# Patient Record
Sex: FEMALE | Race: WHITE
[De-identification: names, ages, dates, MRNs, and addresses within clinical notes are randomized per-mention and may not be internally consistent; named-entity substitution may affect disease eponyms.]

---

## 2021-11-29 ENCOUNTER — HOSPITAL ENCOUNTER (EMERGENCY)
Dept: HOSPITAL 56 - MW.ED | Age: 61
Discharge: HOME | End: 2021-11-29
Payer: COMMERCIAL

## 2021-11-29 DIAGNOSIS — U07.1: Primary | ICD-10-CM

## 2021-11-29 DIAGNOSIS — I50.9: ICD-10-CM

## 2021-11-29 DIAGNOSIS — I11.0: ICD-10-CM

## 2021-11-29 DIAGNOSIS — Z79.82: ICD-10-CM

## 2021-11-29 DIAGNOSIS — R00.0: ICD-10-CM

## 2021-11-29 DIAGNOSIS — Z88.1: ICD-10-CM

## 2021-11-29 DIAGNOSIS — Z79.899: ICD-10-CM

## 2021-11-29 LAB
BUN SERPL-MCNC: 15 MG/DL (ref 7–18)
CHLORIDE SERPL-SCNC: 104 MMOL/L (ref 98–107)
CO2 SERPL-SCNC: 27.3 MMOL/L (ref 21–32)
FLUAV RNA UPPER RESP QL NAA+PROBE: NEGATIVE
FLUBV RNA UPPER RESP QL NAA+PROBE: NEGATIVE
GLUCOSE SERPL-MCNC: 121 MG/DL (ref 74–106)
POTASSIUM SERPL-SCNC: 4.6 MMOL/L (ref 3.5–5.1)
SARS-COV-2 RNA RESP QL NAA+PROBE: POSITIVE
SODIUM SERPL-SCNC: 140 MMOL/L (ref 136–145)

## 2021-11-29 PROCEDURE — 93005 ELECTROCARDIOGRAM TRACING: CPT

## 2021-11-29 PROCEDURE — 96374 THER/PROPH/DIAG INJ IV PUSH: CPT

## 2021-11-29 PROCEDURE — 99284 EMERGENCY DEPT VISIT MOD MDM: CPT

## 2021-11-29 PROCEDURE — 71045 X-RAY EXAM CHEST 1 VIEW: CPT

## 2021-11-29 PROCEDURE — 85025 COMPLETE CBC W/AUTO DIFF WBC: CPT

## 2021-11-29 PROCEDURE — 80053 COMPREHEN METABOLIC PANEL: CPT

## 2021-11-29 PROCEDURE — 36415 COLL VENOUS BLD VENIPUNCTURE: CPT

## 2021-11-29 PROCEDURE — 0240U: CPT

## 2021-11-29 NOTE — CR
Indication:



Cough, possible corona virus 



Comparison:



None available.



Technique:



Single AP view chest



Findings:



There is hyperinflation and chronic interstitial change.



There is no focal consolidation, effusion, or pneumothorax.



The cardiomediastinal silhouette is within normal limits.



The bony thorax is grossly intact.



Impression:



Hyperinflation and chronic interstitial changes without definite dense or 

ground-glass consolidation at this time.



Dictated by Roscoe Martin MD @ 11/29/2021 8:13:48 AM



(Electronically Signed)

## 2021-11-29 NOTE — EDM.PDOC
ED HPI GENERAL MEDICAL PROBLEM





- General


Chief Complaint: Respiratory Problem


Stated Complaint: COVID SYMPTOMS


Time Seen by Provider: 11/29/21 06:41


Source of Information: Reports: Patient


History Limitations: Reports: No Limitations





- History of Present Illness


INITIAL COMMENTS - FREE TEXT/NARRATIVE: 





Patient is a 61-year-old female with a history of CHF high blood pressure who 

presented today for symptoms of fatigue weakness and cough.  Patient has 

concerns of having Covid.  She denies any shortness of breath states she does 

have a nonproductive cough she has diffuse body aches but denies any focal areas

of pain to chest pain abdominal pain.  States she has not been taking medication

because has been feeling nauseated as well and her taste sensation has been off 

as well as smell.


  ** Generalized


Pain Score (Numeric/FACES): 8





- Related Data


                                    Allergies











Allergy/AdvReac Type Severity Reaction Status Date / Time


 


cephalexin Allergy  Rash Verified 11/29/21 07:00











Home Meds: 


                                    Home Meds





Acetaminophen 1,000 mg PO BID 11/29/21 [History]


Albuterol Sulfate [Proair Digihaler] 90 mg IH DAILY 11/29/21 [History]


Ascorbic Acid [Vitamin C] 500 mg PO DAILY 11/29/21 [History]


Aspirin 81 mg PO DAILY 11/29/21 [History]


Calcium Carbonate 600 mg PO DAILY 11/29/21 [History]


Celecoxib 200 mg PO BID 11/29/21 [History]


Cetirizine [ZyrTEC] 10 mg PO DAILY 11/29/21 [History]


Cholecalciferol (Vitamin D3) [Vitamin D3] 125 mcg PO DAILY 11/29/21 [History]


DULoxetine [Cymbalta] 30 mg PO DAILY 11/29/21 [History]


Escitalopram [Lexapro] 20 mg PO DAILY 11/29/21 [History]


Eyelid Cleanser Combination 9 [Systane] 1 drop EYEBOTH DAILY 11/29/21 [History]


Famotidine 10 mg PO ASDIRECTED 11/29/21 [History]


Krill/Omega-3/Dha/Epa/Lipids [Omega-3 Krill Oil 500 mg Sfgl] 500 mg PO DAILY 

11/29/21 [History]


Lansoprazole 15 mg PO DAILY 11/29/21 [History]


Losartan [Cozaar] 50 mg PO DAILY 11/29/21 [History]


Melatonin 5 mg PO BEDTIME 11/29/21 [History]


Metoprolol Tartrate 25 mg PO BID 11/29/21 [History]


Mometasone/Formoterol [Dulera 200 Mcg-5 Mcg Inhaler] 200 mg IH DAILY 11/29/21 

[History]


Montelukast [Singulair] 10 mg PO DAILY 11/29/21 [History]


Pantoprazole [ProTONIX***] 40 mg PO DAILY 11/29/21 [History]


Simethicone 125 mg PO DAILY 11/29/21 [History]


Sodium Chloride/Aloe Vera [Ayr Saline Nasal Gel Spray] 1 spray NASBOTH DAILY 

11/29/21 [History]


Spironolactone [Aldactone] 25 mg PO DAILY 11/29/21 [History]


Triamcinolone Acetonide [Nasacort AQ Spray] 55 mcg NASBOTH DAILY 11/29/21 

[History]


Zinc 50 mg PO DAILY 11/29/21 [History]











ED ROS GENERAL





- Review of Systems


Review Of Systems: See Below


Constitutional: Reports: Chills, Malaise, Weakness, Fatigue


HEENT: Reports: No Symptoms


Respiratory: Reports: No Symptoms


Cardiovascular: Reports: No Symptoms


Endocrine: Reports: No Symptoms


GI/Abdominal: Reports: No Symptoms


: Reports: No Symptoms


Musculoskeletal: Reports: No Symptoms


Skin: Reports: No Symptoms


Neurological: Reports: No Symptoms


Psychiatric: Reports: No Symptoms


Hematologic/Lymphatic: Reports: No Symptoms


Immunologic: Reports: No Symptoms





ED EXAM, GENERAL





- Physical Exam


Exam: See Below


Exam Limited By: No Limitations


General Appearance: Alert, WD/WN, No Apparent Distress


Head: Atraumatic, Normocephalic


Respiratory/Chest: No Respiratory Distress, Lungs Clear, Normal Breath Sounds


Cardiovascular: Normal Peripheral Pulses, Regular Rate, Rhythm


GI/Abdominal: Normal Bowel Sounds, Soft, Non-Tender


Extremities: Normal Inspection, Normal Range of Motion


Neurological: Alert, Oriented, Normal Cognition, Normal Gait


  ** #1 Interpretation


EKG Date: 11/29/21


Time: 07:43


Rhythm: Other (sinus tach)


Rate (Beats/Min): 103


ST-T: Normal





Course





- Vital Signs


Last Recorded V/S: 


                                Last Vital Signs











Temp  97.5 F   11/29/21 07:23


 


Pulse  80   11/29/21 09:09


 


Resp  20   11/29/21 09:09


 


BP  144/75 H  11/29/21 09:09


 


Pulse Ox  95   11/29/21 09:09














- Orders/Labs/Meds


Orders: 


                               Active Orders 24 hr











 Category Date Time Status


 


 RT Aerosol Therapy [RC] ASDIRECTED Care  11/29/21 08:51 Active











Labs: 


                                Laboratory Tests











  11/29/21 11/29/21 11/29/21 Range/Units





  06:50 08:50 08:50 


 


WBC   4.92   (4.0-11.0)  K/uL


 


RBC   4.20 L   (4.30-5.90)  M/uL


 


Hgb   12.7   (12.0-16.0)  g/dL


 


Hct   38.3   (36.0-46.0)  %


 


MCV   91.2   (80.0-98.0)  fL


 


MCH   30.2   (27.0-32.0)  pg


 


MCHC   33.2   (31.0-37.0)  g/dL


 


RDW Std Deviation   44.0   (28.0-62.0)  fl


 


RDW Coeff of Pam   13   (11.0-15.0)  %


 


Plt Count   149 L   (150-400)  K/uL


 


MPV   9.60   (7.40-12.00)  fL


 


Neut % (Auto)   76.9   (48.0-80.0)  %


 


Lymph % (Auto)   15.4 L   (16.0-40.0)  %


 


Mono % (Auto)   7.5   (0.0-15.0)  %


 


Eos % (Auto)   0.0   (0.0-7.0)  %


 


Baso % (Auto)   0.2   (0.0-1.5)  %


 


Neut # (Auto)   3.8   (1.4-5.7)  K/uL


 


Lymph # (Auto)   0.8   (0.6-2.4)  K/uL


 


Mono # (Auto)   0.4   (0.0-0.8)  K/uL


 


Eos # (Auto)   0.0   (0.0-0.7)  K/uL


 


Baso # (Auto)   0.0   (0.0-0.1)  K/uL


 


Nucleated RBC %   0.0   /100WBC


 


Nucleated RBCs #   0   K/uL


 


Sodium    140  (136-145)  mmol/L


 


Potassium    4.6  (3.5-5.1)  mmol/L


 


Chloride    104  ()  mmol/L


 


Carbon Dioxide    27.3  (21.0-32.0)  mmol/L


 


BUN    15  (7.0-18.0)  mg/dL


 


Creatinine    0.9  (0.6-1.0)  mg/dL


 


Est Cr Clr Drug Dosing    55.25  mL/min


 


Estimated GFR (MDRD)    > 60.0  ml/min


 


Glucose    121 H  ()  mg/dL


 


Calcium    8.1 L  (8.5-10.1)  mg/dL


 


Total Bilirubin    0.2  (0.2-1.0)  mg/dL


 


AST    29  (15-37)  IU/L


 


ALT    25  (14-63)  IU/L


 


Alkaline Phosphatase    61  ()  U/L


 


Total Protein    7.3  (6.4-8.2)  g/dL


 


Albumin    3.5  (3.4-5.0)  g/dL


 


Globulin    3.8  (2.6-4.0)  g/dL


 


Albumin/Globulin Ratio    0.9  (0.9-1.6)  


 


Influenza Type A RNA  NEGATIVE    (NEGATIVE)  


 


Influenza Type B RNA  NEGATIVE    (NEGATIVE)  


 


SARS-CoV-2 RNA (SOSA)  POSITIVE H    (NEGATIVE)  











Meds: 


Medications














Discontinued Medications














Generic Name Dose Route Start Last Admin





  Trade Name Freq  PRN Reason Stop Dose Admin


 


Albuterol/Ipratropium  3 ml  11/29/21 08:50  11/29/21 09:04





  Albuterol/Ipratropium 3.0-0.5 Mg/3 Ml Neb Soln  NEB  11/29/21 08:51  3 ml





  ONETIME ONE   Administration


 


Dexamethasone  10 mg  11/29/21 08:50  11/29/21 09:04





  Dexamethasone 10 Mg/Ml Sdv  IVPUSH  11/29/21 08:51  10 mg





  ONETIME ONE   Administration


 


Metoprolol Tartrate  25 mg  11/29/21 07:07  11/29/21 07:40





  Metoprolol Tartrate 25 Mg Tab  PO  11/29/21 07:08  25 mg





  ONETIME ONE   Administration


 


Ondansetron HCl  4 mg  11/29/21 07:07  11/29/21 07:40





  Ondansetron 4 Mg Tab.Dis  PO  11/29/21 07:08  4 mg





  ONETIME ONE   Administration














- Re-Assessments/Exams


Free Text/Narrative Re-Assessment/Exam: 





11/29/21 10:09


He is Covid positive ox levels been greater than 95%.  We will refer patient to 

the antibody infusion clinic.  Patient also given strict return precautions.





Departure





- Departure


Time of Disposition: 10:10


Disposition: Home, Self-Care 01


Condition: Good


Clinical Impression: 


 COVID








- Discharge Information


*PRESCRIPTION DRUG MONITORING PROGRAM REVIEWED*: Not Applicable


*COPY OF PRESCRIPTION DRUG MONITORING REPORT IN PATIENT RYAN: Not Applicable


Instructions:  10 Things You Can Do to Manage Your COVID-19 Symptoms at Home - 

[facility] (07/16/2021)


Referrals: 


PCP,None [Primary Care Provider] - 


Forms:  ED Department Discharge


Additional Instructions: 


You were seen today and you tested positive for COVID-19.  Your oxygen levels 

greater than 95% so you are stable to be discharged home.  We did send paperwork

to have you referred to the antibiotic infusion clinic and they will contact you

to be seen and have infusion done.  If you become more short of breath or have a

low oxygen level at home please return to ED immediately otherwise continue to 

follow-up to primary care physician.  We also sent a prescription of steroids to

your pharmacy as well to take for the next few days.








The following information is given to patients seen in the emergency department 

who are being discharged to home. This information is to outline your options 

for follow-up care. We provide all patients seen in our emergency department 

with a follow-up referral.





The need for follow-up, as well as the timing and circumstances, are variable 

depending upon the specifics of your emergency department visit.





If you don't have a primary care physician on staff, we will provide you with a 

referral. We always advise you to contact your personal physician following an 

emergency department visit to inform them of the circumstance of the visit and 

for follow-up with them and/or the need for any referrals to a consulting 

specialist.





The emergency department will also refer you to a specialist when appropriate. 

This referral assures that you have the opportunity for follow-up care with a 

specialist. All of these measure are taken in an effort to provide you with 

optimal care, which includes your follow-up.





Under all circumstances we always encourage you to contact your private 

physician who remains a resource for coordinating your care. When calling for 

follow-up care, please make the office aware that this follow-up is from your 

recent emergency room visit. If for any reason you are refused follow-up, please

contact the  Emergency Department

at [phone] and asked to speak to the emergency department charge nurse.





Please follow up with your primary care physician. If you do not have a primary 

care physician, see below:


[facility]


[address] [place], [place] [address] [phone]





[facility]


[address] [place], [place] [address] [phone]














Sepsis Event Note (ED)





- Focused Exam


Vital Signs: 


                                   Vital Signs











  Temp Pulse Pulse Resp BP BP Pulse Ox


 


 11/29/21 09:09    80  20   144/75 H  95


 


 11/29/21 07:40   108 H    142/97 H  


 


 11/29/21 07:23  97.5 F   120 H  20   147/104 H  93 L














- My Orders


Last 24 Hours: 


My Active Orders





11/29/21 08:51


RT Aerosol Therapy [RC] ASDIRECTED 














- Assessment/Plan


Last 24 Hours: 


My Active Orders





11/29/21 08:51


RT Aerosol Therapy [RC] ASDIRECTED 











Plan: 





Patient is a 61-year-old female who presents today for body aches and fatigue.  

Patient has concerns about for Covid.  She is satting 95% on room air and lungs 

are clear.  Patient has tachycardia to the 120s but has not taken her metoprolol

 for the past few days due to the nausea.  Will obtain EKG try to provide 

patient her medications and reassess.

## 2021-11-30 ENCOUNTER — HOSPITAL ENCOUNTER (EMERGENCY)
Dept: HOSPITAL 56 - MW.ED | Age: 61
Discharge: HOME | End: 2021-11-30
Payer: MEDICARE

## 2021-11-30 DIAGNOSIS — Z79.899: ICD-10-CM

## 2021-11-30 DIAGNOSIS — I11.0: ICD-10-CM

## 2021-11-30 DIAGNOSIS — J44.9: ICD-10-CM

## 2021-11-30 DIAGNOSIS — Z79.82: ICD-10-CM

## 2021-11-30 DIAGNOSIS — I25.2: ICD-10-CM

## 2021-11-30 DIAGNOSIS — M19.90: ICD-10-CM

## 2021-11-30 DIAGNOSIS — I50.9: ICD-10-CM

## 2021-11-30 DIAGNOSIS — E11.9: ICD-10-CM

## 2021-11-30 DIAGNOSIS — R09.02: Primary | ICD-10-CM

## 2021-11-30 DIAGNOSIS — Z86.16: ICD-10-CM

## 2021-11-30 DIAGNOSIS — Z88.1: ICD-10-CM

## 2021-11-30 LAB
BUN SERPL-MCNC: 20 MG/DL (ref 7–18)
CHLORIDE SERPL-SCNC: 102 MMOL/L (ref 98–107)
CO2 SERPL-SCNC: 23 MMOL/L (ref 21–32)
GLUCOSE SERPL-MCNC: 129 MG/DL (ref 74–106)
POTASSIUM SERPL-SCNC: 4.2 MMOL/L (ref 3.5–5.1)
SODIUM SERPL-SCNC: 140 MMOL/L (ref 136–145)

## 2021-11-30 PROCEDURE — 71045 X-RAY EXAM CHEST 1 VIEW: CPT

## 2021-11-30 PROCEDURE — 85025 COMPLETE CBC W/AUTO DIFF WBC: CPT

## 2021-11-30 PROCEDURE — 99284 EMERGENCY DEPT VISIT MOD MDM: CPT

## 2021-11-30 PROCEDURE — 80053 COMPREHEN METABOLIC PANEL: CPT

## 2021-11-30 PROCEDURE — 96374 THER/PROPH/DIAG INJ IV PUSH: CPT

## 2021-11-30 PROCEDURE — 93005 ELECTROCARDIOGRAM TRACING: CPT

## 2021-11-30 PROCEDURE — 36415 COLL VENOUS BLD VENIPUNCTURE: CPT

## 2021-11-30 NOTE — CR
INDICATION:



Cough, shortness breath, COVID positive 11/29/2021.



TECHNIQUE:



Chest 1 view.



COMPARISON:



Chest radiograph 11/29/2021.



FINDINGS:



Subtle hazy opacity in the left lung base may represent atelectasis or 

infiltrate. No pleural effusion or pneumothorax. Normal heart size and 

pulmonary vascularity. Bilateral shoulder arthroplasties.



IMPRESSION:



Subtle hazy opacity in the left lung base may represent atelectasis or 

infiltrate.



Dictated by Saba Peace MD @ 11/30/2021 4:28:16 PM



(Electronically Signed)

## 2021-11-30 NOTE — EDM.PDOC
ED HPI GENERAL MEDICAL PROBLEM





- General


Chief Complaint: Respiratory Problem


Stated Complaint: PTS 02 LEVELS DROPPING BELOW 80


Time Seen by Provider: 11/30/21 14:58


Source of Information: Reports: Patient


History Limitations: Reports: No Limitations





- History of Present Illness


INITIAL COMMENTS - FREE TEXT/NARRATIVE: 





Patient is a 61-year-old female with multiple health problems who tested 

positive for Covid yesterday presents today because oxygen level was less than 

80 at home.  Patient went to the infusion clinic ox level was fine there when 

patient arrived she was also satting 95% room air states that her pulse ox told 

it was less than 80.  She is never complained too much shortness of breath no 

chest pain states that she does feel tired but her body aches have improved she 

still tolerating p.o. has no other complaints.





- Related Data


                                    Allergies











Allergy/AdvReac Type Severity Reaction Status Date / Time


 


cephalexin Allergy  Rash Verified 11/30/21 15:22











Home Meds: 


                                    Home Meds





Acetaminophen 1,000 mg PO BID 11/29/21 [History]


Albuterol Sulfate [Proair Digihaler] 90 mg IH DAILY 11/29/21 [History]


Ascorbic Acid [Vitamin C] 500 mg PO DAILY 11/29/21 [History]


Aspirin 81 mg PO DAILY 11/29/21 [History]


Calcium Carbonate 600 mg PO DAILY 11/29/21 [History]


Celecoxib 200 mg PO BID 11/29/21 [History]


Cetirizine [ZyrTEC] 10 mg PO DAILY 11/29/21 [History]


Cholecalciferol (Vitamin D3) [Vitamin D3] 125 mcg PO DAILY 11/29/21 [History]


DULoxetine [Cymbalta] 30 mg PO DAILY 11/29/21 [History]


Escitalopram [Lexapro] 20 mg PO DAILY 11/29/21 [History]


Eyelid Cleanser Combination 9 [Systane] 1 drop EYEBOTH DAILY 11/29/21 [History]


Famotidine 10 mg PO ASDIRECTED 11/29/21 [History]


Krill/Omega-3/Dha/Epa/Lipids [Omega-3 Krill Oil 500 mg Sfgl] 500 mg PO DAILY 

11/29/21 [History]


Lansoprazole 15 mg PO DAILY 11/29/21 [History]


Losartan [Cozaar] 50 mg PO DAILY 11/29/21 [History]


Melatonin 5 mg PO BEDTIME 11/29/21 [History]


Metoprolol Tartrate 25 mg PO BID 11/29/21 [History]


Mometasone/Formoterol [Dulera 200 Mcg-5 Mcg Inhaler] 200 mg IH DAILY 11/29/21 

[History]


Montelukast [Singulair] 10 mg PO DAILY 11/29/21 [History]


Pantoprazole [ProTONIX***] 40 mg PO DAILY 11/29/21 [History]


Simethicone 125 mg PO DAILY 11/29/21 [History]


Sodium Chloride/Aloe Vera [Ayr Saline Nasal Gel Spray] 1 spray NASBOTH DAILY 

11/29/21 [History]


Spironolactone [Aldactone] 25 mg PO DAILY 11/29/21 [History]


Triamcinolone Acetonide [Nasacort AQ Spray] 55 mcg NASBOTH DAILY 11/29/21 

[History]


Zinc 50 mg PO DAILY 11/29/21 [History]


predniSONE [Prednisone] 50 mg PO DAILY 4 Days #4 tablet 11/29/21 [Rx]











Past Medical History


HEENT History: Reports: Impaired Vision, Other (See Below)


Other HEENT History: dentures


Cardiovascular History: Reports: Heart Failure, High Cholesterol, Hypertension, 

MI


Respiratory History: Reports: Asthma, COPD


Gastrointestinal History: Reports: Chronic Constipation, Chronic Diarrhea


Genitourinary History: Reports: None


OB/GYN History: Reports: Pregnancy


Musculoskeletal History: Reports: Arthritis, Fibromyalgia


Psychiatric History: Reports: Anxiety, Depression


Endocrine/Metabolic History: Reports: Diabetes, Type II





- Infectious Disease History


Infectious Disease History: Reports: Chicken Pox





- Past Surgical History


HEENT Surgical History: Reports: Adenoidectomy, Naso-Sinus Surgery, 

Tonsillectomy


Cardiovascular Surgical History: Reports: None


Respiratory Surgical History: Reports: None


GI Surgical History: Reports: Appendectomy, Colonoscopy


Female  Surgical History: Reports: Hysterectomy


Musculoskeletal Surgical History: Reports: Shoulder Replacement





Social & Family History





- Family History


Family Medical History: No Pertinent Family History





- Tobacco Use


Second Hand Smoke Exposure: No





- Caffeine Use


Caffeine Use: Reports: None





- Recreational Drug Use


Recreational Drug Use: No





ED ROS GENERAL





- Review of Systems


Review Of Systems: See Below


Constitutional: Reports: No Symptoms


HEENT: Reports: No Symptoms


Respiratory: Reports: No Symptoms


Cardiovascular: Reports: No Symptoms


Endocrine: Reports: No Symptoms


GI/Abdominal: Reports: No Symptoms


: Reports: No Symptoms


Musculoskeletal: Reports: No Symptoms


Skin: Reports: No Symptoms


Neurological: Reports: No Symptoms


Psychiatric: Reports: No Symptoms


Hematologic/Lymphatic: Reports: No Symptoms


Immunologic: Reports: No Symptoms





ED EXAM, GENERAL





- Physical Exam


Exam: See Below


Exam Limited By: No Limitations


General Appearance: Alert, WD/WN, No Apparent Distress


Nose: Normal Inspection


Throat/Mouth: Normal Inspection


Head: Atraumatic, Normocephalic


Respiratory/Chest: No Respiratory Distress, Lungs Clear, Normal Breath Sounds


Cardiovascular: Normal Peripheral Pulses, Regular Rate, Rhythm


GI/Abdominal: Normal Bowel Sounds, Soft, Non-Tender


Neurological: Alert, Oriented, Normal Cognition, Normal Gait





Course





- Vital Signs


Last Recorded V/S: 


                                Last Vital Signs











Temp  98.6 F   11/30/21 15:20


 


Pulse  90   11/30/21 16:00


 


Resp  22 H  11/30/21 16:00


 


BP  126/63   11/30/21 16:00


 


Pulse Ox  95   11/30/21 16:00














- Orders/Labs/Meds


Orders: 


                               Active Orders 24 hr











 Category Date Time Status


 


 Oxygen Therapy [RC] ASDIRECTED Care  11/30/21 14:59 Active











Labs: 


                                Laboratory Tests











  11/30/21 11/30/21 Range/Units





  15:05 15:05 


 


WBC  9.45   (4.0-11.0)  K/uL


 


RBC  4.37   (4.30-5.90)  M/uL


 


Hgb  13.1   (12.0-16.0)  g/dL


 


Hct  39.4   (36.0-46.0)  %


 


MCV  90.2   (80.0-98.0)  fL


 


MCH  30.0   (27.0-32.0)  pg


 


MCHC  33.2   (31.0-37.0)  g/dL


 


RDW Std Deviation  43.8   (28.0-62.0)  fl


 


RDW Coeff of Pam  13   (11.0-15.0)  %


 


Plt Count  202   (150-400)  K/uL


 


MPV  9.90   (7.40-12.00)  fL


 


Neut % (Auto)  78.5   (48.0-80.0)  %


 


Lymph % (Auto)  15.2 L   (16.0-40.0)  %


 


Mono % (Auto)  6.2   (0.0-15.0)  %


 


Eos % (Auto)  0.0   (0.0-7.0)  %


 


Baso % (Auto)  0.1   (0.0-1.5)  %


 


Neut # (Auto)  7.4 H   (1.4-5.7)  K/uL


 


Lymph # (Auto)  1.4   (0.6-2.4)  K/uL


 


Mono # (Auto)  0.6   (0.0-0.8)  K/uL


 


Eos # (Auto)  0.0   (0.0-0.7)  K/uL


 


Baso # (Auto)  0.0   (0.0-0.1)  K/uL


 


Nucleated RBC %  0.0   /100WBC


 


Nucleated RBCs #  0   K/uL


 


Sodium   140  (136-145)  mmol/L


 


Potassium   4.2  (3.5-5.1)  mmol/L


 


Chloride   102  ()  mmol/L


 


Carbon Dioxide   23.0  (21.0-32.0)  mmol/L


 


BUN   20 H  (7.0-18.0)  mg/dL


 


Creatinine   0.9  (0.6-1.0)  mg/dL


 


Est Cr Clr Drug Dosing   55.25  mL/min


 


Estimated GFR (MDRD)   > 60.0  ml/min


 


Glucose   129 H  ()  mg/dL


 


Calcium   8.5  (8.5-10.1)  mg/dL


 


Total Bilirubin   0.2  (0.2-1.0)  mg/dL


 


AST   44 H  (15-37)  IU/L


 


ALT   35  (14-63)  IU/L


 


Alkaline Phosphatase   52  ()  U/L


 


Total Protein   7.4  (6.4-8.2)  g/dL


 


Albumin   3.4  (3.4-5.0)  g/dL


 


Globulin   4.0  (2.6-4.0)  g/dL


 


Albumin/Globulin Ratio   0.9  (0.9-1.6)  











Meds: 


Medications














Discontinued Medications














Generic Name Dose Route Start Last Admin





  Trade Name Freq  PRN Reason Stop Dose Admin


 


Dexamethasone  10 mg  11/30/21 14:59  11/30/21 15:13





  Dexamethasone 10 Mg/Ml Sdv  IVPUSH  11/30/21 15:00  Not Given





  ONETIME ONE  


 


Ondansetron HCl  4 mg  11/30/21 15:50  11/30/21 15:56





  Ondansetron 4 Mg/2 Ml Sdv  IVPUSH  11/30/21 15:51  4 mg





  ONETIME ONE   Administration














- Re-Assessments/Exams


Free Text/Narrative Re-Assessment/Exam: 





11/30/21 16:40


Patient's oxygen level remains around 95% and she continues to look well.  

Patient received antibody infusion today already patient will be discharged home

 and given strict return precautions.





Departure





- Departure


Time of Disposition: 16:40


Disposition: Home, Self-Care 01


Condition: Good


Clinical Impression: 


 Hypoxia








- Discharge Information


*PRESCRIPTION DRUG MONITORING PROGRAM REVIEWED*: Not Applicable


*COPY OF PRESCRIPTION DRUG MONITORING REPORT IN PATIENT RYAN: Not Applicable


Instructions:  Hypoxemia


Referrals: 


PCP,None [Primary Care Provider] - 


Forms:  ED Department Discharge


Additional Instructions: 


You were seen today for low oxygen saturation.  Oxygen saturation has been 

greater than 95% here you have not requiring oxygen.  Your symptoms are likely 

related to Covid recommend continue to rest and if you do feel more short of 

breath please return to ED immediately otherwise follow-up to primary care 

physician.  Below are numbers of primary care physician area can call to obtain 

appointment.








or follow-up care. We provide all patients seen in our emergency department with

a follow-up referral.





The need for follow-up, as well as the timing and circumstances, are variable 

depending upon the specifics of your emergency department visit.





If you don't have a primary care physician on staff, we will provide you with a 

referral. We always advise you to contact your personal physician following an 

emergency department visit to inform them of the circumstance of the visit and 

for follow-up with them and/or the need for any referrals to a consulting 

specialist.





The emergency department will also refer you to a specialist when appropriate. 

This referral assures that you have the opportunity for follow-up care with a 

specialist. All of these measure are taken in an effort to provide you with 

optimal care, which includes your follow-up.





Under all circumstances we always encourage you to contact your private 

physician who remains a resource for coordinating your care. When calling for 

follow-up care, please make the office aware that this follow-up is from your 

recent emergency room visit. If for any reason you are refused follow-up, please

contact the [facility] Emergency Department

at [phone] and asked to speak to the emergency department charge nurse.





Please follow up with your primary care physician. If you do not have a primary 

care physician, see below:


[facility]


[address] [place], [place] [id number] [phone]





[facility]


[address] [place] [address] [phone]














Sepsis Event Note (ED)





- Evaluation


Sepsis Screening Result: No Definite Risk





- Focused Exam


Vital Signs: 


                                   Vital Signs











  Temp Pulse Resp BP Pulse Ox


 


 11/30/21 16:00   90  22 H  126/63  95


 


 11/30/21 15:20  98.6 F  75  22 H  130/68  93 L














- My Orders


Last 24 Hours: 


My Active Orders





11/30/21 14:59


Oxygen Therapy [RC] ASDIRECTED 














- Assessment/Plan


Last 24 Hours: 


My Active Orders





11/30/21 14:59


Oxygen Therapy [RC] ASDIRECTED 











Plan: 





This is a 61-year-old female presents today for low O2.  She states her pulse ox

 at home was less than 80 but here her oximeter was 95% and she had antibiotic 

infusion done today and oxygen was fine there.  We will continue to monitor 

patient repeat x-ray and reassess.

## 2021-11-30 NOTE — PCM.EKG
** #1 Interpretation


EKG Date: 11/30/21


Time: 15:05


Rhythm: NSR


Rate (Beats/Min): 71


ST-T: Normal

## 2021-12-05 ENCOUNTER — HOSPITAL ENCOUNTER (EMERGENCY)
Dept: HOSPITAL 56 - MW.ED | Age: 61
Discharge: HOME | End: 2021-12-05
Payer: MEDICARE

## 2021-12-05 DIAGNOSIS — E11.9: ICD-10-CM

## 2021-12-05 DIAGNOSIS — J44.9: ICD-10-CM

## 2021-12-05 DIAGNOSIS — Z79.82: ICD-10-CM

## 2021-12-05 DIAGNOSIS — E78.00: ICD-10-CM

## 2021-12-05 DIAGNOSIS — I11.0: ICD-10-CM

## 2021-12-05 DIAGNOSIS — Z88.1: ICD-10-CM

## 2021-12-05 DIAGNOSIS — I25.2: ICD-10-CM

## 2021-12-05 DIAGNOSIS — Z79.899: ICD-10-CM

## 2021-12-05 DIAGNOSIS — U07.1: Primary | ICD-10-CM

## 2021-12-05 DIAGNOSIS — I50.9: ICD-10-CM

## 2021-12-05 LAB
BUN SERPL-MCNC: 22 MG/DL (ref 7–18)
CHLORIDE SERPL-SCNC: 103 MMOL/L (ref 98–107)
CO2 SERPL-SCNC: 22.7 MMOL/L (ref 21–32)
GLUCOSE SERPL-MCNC: 121 MG/DL (ref 74–106)
POTASSIUM SERPL-SCNC: 3.5 MMOL/L (ref 3.5–5.1)
SODIUM SERPL-SCNC: 139 MMOL/L (ref 136–145)

## 2021-12-05 PROCEDURE — 36415 COLL VENOUS BLD VENIPUNCTURE: CPT

## 2021-12-05 PROCEDURE — 93005 ELECTROCARDIOGRAM TRACING: CPT

## 2021-12-05 PROCEDURE — 84484 ASSAY OF TROPONIN QUANT: CPT

## 2021-12-05 PROCEDURE — 80053 COMPREHEN METABOLIC PANEL: CPT

## 2021-12-05 PROCEDURE — 99284 EMERGENCY DEPT VISIT MOD MDM: CPT

## 2021-12-05 PROCEDURE — 85025 COMPLETE CBC W/AUTO DIFF WBC: CPT

## 2021-12-05 PROCEDURE — 71045 X-RAY EXAM CHEST 1 VIEW: CPT

## 2021-12-05 PROCEDURE — 96374 THER/PROPH/DIAG INJ IV PUSH: CPT

## 2021-12-05 NOTE — PCM.EKG
** #1 Interpretation


EKG Date: 12/05/21


Time: 11:06


EKG Interpretation Comments: 





EKG:


As interpreted by ER physician: Kareen:


Nonspecific ST-T wave abnormalities


Normal axis


No evidence of ST elevation MI


Sinus tachycardia with heart rate of 105

## 2021-12-05 NOTE — EDM.PDOC
ED HPI GENERAL MEDICAL PROBLEM





- General


Chief Complaint: Respiratory Problem


Stated Complaint: SOB


Time Seen by Provider: 12/05/21 11:01


Source of Information: Reports: Patient


History Limitations: Reports: No Limitations





- History of Present Illness


INITIAL COMMENTS - FREE TEXT/NARRATIVE: 


HISTORY AND PHYSICAL:





History of present illness:


Patient is a 61-year-old female, with a known COVID-19 diagnosis diagnosed 6 

days ago, but symptomatic for 2 weeks, who presents emergency room today with 

concern of vomiting.  Patient states that she has shortness of breath but states

that this is strictly related to vomiting.  She states that she does not feel 

short of breath as she is not vomiting but feels like she is vomiting so 

frequently that she cannot catch her breath in between episodes of vomiting.  

Patient states that she has had symptoms of Covid for 2 weeks but was diagnosed 

6 days ago.  Patient states that she has had intermittent vomiting since her 

initial COVID-19 diagnosis and started vomiting again this morning.  Patient 

states that she cannot get herself to stop vomiting and was feeling short of 

breath due to the frequent episodes of vomiting this morning so came to the 

emergency room.  Patient states that she is not having any chest pain or 

shortness of breath outside of vomiting episodes.  Patient states that she has 

been tired and rundown due to the Covid 19 diagnosis but states other than this,

she has no other complaints.





Patient denies fever, chills, chest pain. Denies headache, neck stiff ness, 

change in vision, syncope, or near syncope. Denies abdominal pain, diarrhea, 

constipation, or dysuria. Has not noted any blood in urine or stool. Patient has

been eating and drinking appropriately.





Review of systems: 


As per history of present illness and below otherwise all systems reviewed and 

negative.





Past medical history: 


As per history of present illness and as reviewed below otherwise 

noncontributory.





Surgical history: 


As per history of present illness and as reviewed below otherwise 

noncontributory.





Social history: 


See social history for further information





Family history: 


As per history of present illness and as reviewed below otherwise 

noncontributory.





Physical exam:


General: Patient is alert, oriented, and in no acute distress. Patient sitting 

on exam table, actively dry heaving on exam and anxious appearing.  Patient is 

tachycardic 117, respiratory rate tachypneic at 30, but is 94 to 95% on room 

air.


HEENT: Atraumatic, normocephalic, pupils equal and reactive bilaterally, 

negative for conjunctival pallor or scleral icterus, mucous membranes moist, 

throat clear, neck supple, nontender, trachea midline. No drooling or trismus 

noted. No meningeal signs. No hot potato voice noted. 


Lungs: Clear to auscultation, breath sounds equal bilaterally, chest nontender.


Heart: S1S2, regular rate and rhythm without overt murmur


Abdomen: Soft, nondistended, nontender. Negative for masses or 

hepatosplenomegaly. Negative for costovertebral tenderness.


Pelvis: Stable nontender.


Genitourinary: Deferred.


Rectal: Deferred.


Skin: Intact, warm, dry. No lesions or rashes noted.


Extremities: Atraumatic, negative for cords or calf pain. Neurovascular 

unremarkable.


Neuro: Awake, alert, oriented. Cranial nerves II through XII unremarkable. 

Cerebellum unremarkable. Motor and sensory unremarkable throughout. Exam 

nonfocal.





Medical Decision Making:


Patient is a 61-year-old female presents emergency room today with concern of 

known COVID-19 diagnosis diagnosed 6 days ago but symptomatic for 2 weeks who 

presents emergency room today with concern of vomiting causing shortness of 

breath.  On arrival to the ED, patient is actively dry heaving on exam and is 

tachycardic 117 and tachypneic at 30.  However, oxygenation is 94 to 95% and 

patient is otherwise breathing well.  IV access obtained immediately upon 

arrival to the ED and 4 mg of Zofran provided as long as fluids initiated.  

After Zofran was given, patient had stopped vomiting, heart rate had normalized 

to 90 to 100 bpm, and respiratory rate is decreased to 18.  Will obtain cardiac 

evaluation, continue to reassess patient.





See Dr. Mathur dictation for specific EKG interpretation.  Otherwise, sinus 

tachycardia with rate of 105, no STEMI.


Mild derangements of CBC and CMP are unremarkable.  Troponin negative.


Chest x-ray does show heart size and vasculature are normal in caliber and 

appearance.  Small subtle infiltrates are suspected in the periphery of both 

lungs, acute pneumonitis such as COVID-19 viral infection possible.  Remainder 

of lungs clear.





On reevaluation of patient, she now remains comfortable and does not have any 

additional episodes of vomiting or dry heaving today in the emergency room.  She

expresses great improvement of her symptoms with therapeutics today in the 

emergency room.  I will provide patient with a dose of Zofran.  Reevaluation of 

patient's vitals remained stable and oxygen 95%.  Strict return precautions 

thoroughly discussed with patient.  Discussed importance for follow-up with a 

primary care provider.


Voices understanding and is agreeable to plan of care. Denies any further 

questions or concerns at this time.





Diagnostics:


EKG, CBC, CMP, chest x-ray 1 view, troponin





Therapeutics:


Normal saline, Zofran





Prescription:


Zofran





Impression: 


COVID-19 viral infection


Vomiting, not intractable





Plan:


1.  Take medication as prescribed.  Your medication has been sent to [facility].  I encourage you to take small but frequent sips of fluid to prevent 

dehydration.  Your vital signs and oxygen saturation are well enough that you 

were able to monitor your symptoms at home.  Continue to monitor for trouble 

breathing,  new confusion or inability to arouse, bluish lips or face or any of 

the other symptoms we discussed -if this occurs please return to the emergency 

room.Continue to monitor your health at home for worsening symptoms so that you 

can be taken care of and treated quickly if needed. 


 2.  Please self quarantine until 10 days have passed since your symptoms began 

AND you are fever free (<100.4 degrees fahrenheit) for 24 hours without the use 

of fever-reducing medications AND symptoms are improving.  You should restrict 

activities outside of your home, except for getting medical care. Do not go to 

work, school, or public areas. Avoid using public transportation, ride-sharing, 

or taxis. Inform any persons that you have been in contact with since you 

started becoming symptomatic that you have tested positive; they should be made 

aware and take the appropriate steps as needed.


3.  You may alternate Tylenol and ibuprofen as needed for pain and fever 

management.


4. The [facility] health department will be calling you and following up with you. 

The [place] COVID 19 Hotline phone number [phone], They are open Monday - 

Friday 7am - 7pm. Follow up with your primary care provider for re-evaluation 

and re-testing after quarantine and discuss when you should be seen. 


6. For more specific guidelines regarding isolation/quarantine please visit this

website. 

https://www.health.[place].gov/sites/www/files/documents/Files/KIARA/coronavirus/Factsh


eet_for_People_With_COVID-19.pdf








Definitive disposition and diagnosis as appropriate pending reevaluation and 

review of above.





  ** epigastric


Pain Score (Numeric/FACES): 6





- Related Data


                                    Allergies











Allergy/AdvReac Type Severity Reaction Status Date / Time


 


cephalexin Allergy  Rash Verified 12/05/21 11:06











Home Meds: 


                                    Home Meds





Acetaminophen 1,000 mg PO BID 11/29/21 [History]


Albuterol Sulfate [Proair Digihaler] 90 mg IH DAILY 11/29/21 [History]


Ascorbic Acid [Vitamin C] 500 mg PO DAILY 11/29/21 [History]


Aspirin 81 mg PO DAILY 11/29/21 [History]


Calcium Carbonate 600 mg PO DAILY 11/29/21 [History]


Celecoxib 200 mg PO BID 11/29/21 [History]


Cetirizine [ZyrTEC] 10 mg PO DAILY 11/29/21 [History]


Cholecalciferol (Vitamin D3) [Vitamin D3] 125 mcg PO DAILY 11/29/21 [History]


DULoxetine [Cymbalta] 30 mg PO DAILY 11/29/21 [History]


Escitalopram [Lexapro] 20 mg PO DAILY 11/29/21 [History]


Eyelid Cleanser Combination 9 [Systane] 1 drop EYEBOTH DAILY 11/29/21 [History]


Famotidine 10 mg PO ASDIRECTED 11/29/21 [History]


Krill/Omega-3/Dha/Epa/Lipids [Omega-3 Krill Oil 500 mg Sfgl] 500 mg PO DAILY 

11/29/21 [History]


Lansoprazole 15 mg PO DAILY 11/29/21 [History]


Losartan [Cozaar] 50 mg PO DAILY 11/29/21 [History]


Melatonin 5 mg PO BEDTIME 11/29/21 [History]


Metoprolol Tartrate 25 mg PO BID 11/29/21 [History]


Mometasone/Formoterol [Dulera 200 Mcg-5 Mcg Inhaler] 200 mg IH DAILY 11/29/21 

[History]


Montelukast [Singulair] 10 mg PO DAILY 11/29/21 [History]


Pantoprazole [ProTONIX***] 40 mg PO DAILY 11/29/21 [History]


Simethicone 125 mg PO DAILY 11/29/21 [History]


Sodium Chloride/Aloe Vera [Ayr Saline Nasal Gel Spray] 1 spray NASBOTH DAILY 

11/29/21 [History]


Spironolactone [Aldactone] 25 mg PO DAILY 11/29/21 [History]


Triamcinolone Acetonide [Nasacort AQ Spray] 55 mcg NASBOTH DAILY 11/29/21 

[History]


Zinc 50 mg PO DAILY 11/29/21 [History]


predniSONE [Prednisone] 50 mg PO DAILY 4 Days #4 tablet 11/29/21 [Rx]


Ondansetron [Zofran ODT] 4 mg PO Q6H PRN #8 tab.dis 12/05/21 [Rx]











Past Medical History


HEENT History: Reports: Impaired Vision, Other (See Below)


Other HEENT History: dentures


Cardiovascular History: Reports: Heart Failure, High Cholesterol, Hypertension, 

MI


Respiratory History: Reports: Asthma, COPD


Gastrointestinal History: Reports: Chronic Constipation, Chronic Diarrhea


Genitourinary History: Reports: None


OB/GYN History: Reports: Pregnancy


Musculoskeletal History: Reports: Arthritis, Fibromyalgia


Psychiatric History: Reports: Anxiety, Depression


Endocrine/Metabolic History: Reports: Diabetes, Type II


Hematologic History: Reports: None


Immunologic History: Reports: None


Oncologic (Cancer) History: Reports: None





- Infectious Disease History


Infectious Disease History: Reports: Chicken Pox





- Past Surgical History


Head Surgeries/Procedures: Reports: None


HEENT Surgical History: Reports: Adenoidectomy, Naso-Sinus Surgery, 

Tonsillectomy


Cardiovascular Surgical History: Reports: None


Respiratory Surgical History: Reports: None


GI Surgical History: Reports: Appendectomy, Colonoscopy


Female  Surgical History: Reports: Hysterectomy


Musculoskeletal Surgical History: Reports: Shoulder Replacement


Oncologic Surgical History: Reports: None





Social & Family History





- Family History


Family Medical History: No Pertinent Family History





- Caffeine Use


Caffeine Use: Reports: None





- Recreational Drug Use


Recreational Drug Use: No





ED ROS GENERAL





- Review of Systems


Review Of Systems: Comprehensive ROS is negative, except as noted in HPI.





ED EXAM, GENERAL





- Physical Exam


Exam: See Below (See dictation)





Course





- Vital Signs


Last Recorded V/S: 


                                Last Vital Signs











Temp  98.1 F   12/05/21 11:03


 


Pulse  117 H  12/05/21 11:03


 


Resp  30 H  12/05/21 11:03


 


BP  118/70   12/05/21 11:03


 


Pulse Ox  94 L  12/05/21 11:03














- Orders/Labs/Meds


Orders: 


                               Active Orders 24 hr











 Category Date Time Status


 


 Sodium Chloride 0.9% [Saline Flush] Med  12/05/21 11:00 Active





 10 ml FLUSH ASDIRECTED PRN   


 


 Sodium Chloride 0.9% [Saline Flush] Med  12/05/21 11:00 Active





 2.5 ml FLUSH ASDIRECTED PRN   


 


 Saline Lock Insert [OM.PC] Stat Oth  12/05/21 11:00 Ordered








                                Medication Orders





Sodium Chloride (Sodium Chloride 0.9% 10 Ml Syringe)  10 ml FLUSH ASDIRECTED PRN


   PRN Reason: Keep Vein Open


   Last Admin: 12/05/21 11:21  Dose: 10 ml


   Documented by: TAIWO


Sodium Chloride (Sodium Chloride 0.9% 2.5 Ml Syringe)  2.5 ml FLUSH ASDIRECTED 

PRN


   PRN Reason: Keep Vein Open


   Last Admin: 12/05/21 11:21  Dose: 2.5 ml


   Documented by: TAIWO








Labs: 


                                Laboratory Tests











  12/05/21 12/05/21 Range/Units





  11:18 11:18 


 


WBC  9.34   (4.0-11.0)  K/uL


 


RBC  4.39   (4.30-5.90)  M/uL


 


Hgb  13.3   (12.0-16.0)  g/dL


 


Hct  38.6   (36.0-46.0)  %


 


MCV  87.9   (80.0-98.0)  fL


 


MCH  30.3   (27.0-32.0)  pg


 


MCHC  34.5   (31.0-37.0)  g/dL


 


RDW Std Deviation  41.2   (28.0-62.0)  fl


 


RDW Coeff of Pam  13   (11.0-15.0)  %


 


Plt Count  372   (150-400)  K/uL


 


MPV  9.10   (7.40-12.00)  fL


 


Neut % (Auto)  74.0   (48.0-80.0)  %


 


Lymph % (Auto)  13.1 L   (16.0-40.0)  %


 


Mono % (Auto)  12.1   (0.0-15.0)  %


 


Eos % (Auto)  0.7   (0.0-7.0)  %


 


Baso % (Auto)  0.1   (0.0-1.5)  %


 


Neut # (Auto)  6.9 H   (1.4-5.7)  K/uL


 


Lymph # (Auto)  1.2   (0.6-2.4)  K/uL


 


Mono # (Auto)  1.1 H   (0.0-0.8)  K/uL


 


Eos # (Auto)  0.1   (0.0-0.7)  K/uL


 


Baso # (Auto)  0.0   (0.0-0.1)  K/uL


 


Nucleated RBC %  0.0   /100WBC


 


Nucleated RBCs #  0   K/uL


 


Sodium   139  (136-145)  mmol/L


 


Potassium   3.5  (3.5-5.1)  mmol/L


 


Chloride   103  ()  mmol/L


 


Carbon Dioxide   22.7  (21.0-32.0)  mmol/L


 


BUN   22 H  (7.0-18.0)  mg/dL


 


Creatinine   0.9  (0.6-1.0)  mg/dL


 


Est Cr Clr Drug Dosing   55.25  mL/min


 


Estimated GFR (MDRD)   > 60.0  ml/min


 


Glucose   121 H  ()  mg/dL


 


Calcium   8.6  (8.5-10.1)  mg/dL


 


Total Bilirubin   0.9  (0.2-1.0)  mg/dL


 


AST   26  (15-37)  IU/L


 


ALT   45  (14-63)  IU/L


 


Alkaline Phosphatase   52  ()  U/L


 


Troponin I   < 0.050  (0.000-0.056)  ng/mL


 


Total Protein   7.5  (6.4-8.2)  g/dL


 


Albumin   3.3 L  (3.4-5.0)  g/dL


 


Globulin   4.2 H  (2.6-4.0)  g/dL


 


Albumin/Globulin Ratio   0.8 L  (0.9-1.6)  











Meds: 


Medications











Generic Name Dose Route Start Last Admin





  Trade Name Freq  PRN Reason Stop Dose Admin


 


Sodium Chloride  10 ml  12/05/21 11:00  12/05/21 11:21





  Sodium Chloride 0.9% 10 Ml Syringe  FLUSH   10 ml





  ASDIRECTED PRN   Administration





  Keep Vein Open  


 


Sodium Chloride  2.5 ml  12/05/21 11:00  12/05/21 11:21





  Sodium Chloride 0.9% 2.5 Ml Syringe  FLUSH   2.5 ml





  ASDIRECTED PRN   Administration





  Keep Vein Open  














Discontinued Medications














Generic Name Dose Route Start Last Admin





  Trade Name Freq  PRN Reason Stop Dose Admin


 


Sodium Chloride  1,000 mls @ 999 mls/hr  12/05/21 11:19  12/05/21 11:21





  Normal Saline  IV  12/05/21 12:19  999 mls/hr





  STAT ONE   Administration


 


Ondansetron HCl  Confirm  12/05/21 11:15  12/05/21 11:21





  Ondansetron 4 Mg/2 Ml Sdv  Administered  12/05/21 11:16  Not Given





  Dose  





  4 mg  





  .ROUTE  





  .STK-MED ONE  


 


Ondansetron HCl  4 mg  12/05/21 11:21  12/05/21 11:22





  Ondansetron 4 Mg/2 Ml Sdv  IVPUSH  12/05/21 11:22  4 mg





  ONETIME ONE   Administration














Departure





- Departure


Time of Disposition: 12:42


Disposition: Home, Self-Care 01


Clinical Impression: 


 COVID-19 virus infection, Vomiting








- Discharge Information


Prescriptions: 


Ondansetron [Zofran ODT] 4 mg PO Q6H PRN #8 tab.dis


 PRN Reason: Nausea/Vomiting


Referrals: 


PCP,None [Primary Care Provider] - 


Forms:  ED Department Discharge


Additional Instructions: 


The following information is given to patients seen in the emergency department 

who are being discharged to home. This information is to outline your options 

for follow-up care. We provide all patients seen in our emergency department 

with a follow-up referral.





The need for follow-up, as well as the timing and circumstances, are variable 

depending upon the specifics of your emergency department visit.





If you don't have a primary care physician on staff, we will provide you with a 

referral. We always advise you to contact your personal physician following an 

emergency department visit to inform them of the circumstance of the visit and 

for follow-up with them and/or the need for any referrals to a consulting 

specialist.





The emergency department will also refer you to a specialist when appropriate. 

This referral assures that you have the opportunity for follow-up care with a 

specialist. All of these measure are taken in an effort to provide you with 

optimal care, which includes your follow-up.





Under all circumstances we always encourage you to contact your private 

physician who remains a resource for coordinating your care. When calling for 

follow-up care, please make the office aware that this follow-up is from your 

recent emergency room visit. If for any reason you are refused follow-up, please

contact the [facility] Emergency Department

at [phone] and asked to speak to the emergency department charge nurse.





[facility]


Primary Care


[address] [place] [address]


Phone: [phone]


Fax: [phone]





[facility]


[address] [place] [address]


Phone: [phone]


Fax: [phone]





1.  Take medication as prescribed.  Your medication has been sent to [facility].  I encourage you to take small but frequent sips of fluid to prevent 

dehydration.  Your vital signs and oxygen saturation are well enough that you 

were able to monitor your symptoms at home.  Continue to monitor for trouble 

breathing,  new confusion or inability to arouse, bluish lips or face or any of 

the other symptoms we discussed -if this occurs please return to the emergency 

room.Continue to monitor your health at home for worsening symptoms so that you 

can be taken care of and treated quickly if needed. 


 2.  Please self quarantine until 10 days have passed since your symptoms began 

AND you are fever free (<100.4 degrees fahrenheit) for 24 hours without the use 

of fever-reducing medications AND symptoms are improving.  You should restrict 

activities outside of your home, except for getting medical care. Do not go to 

work, school, or public areas. Avoid using public transportation, ride-sharing, 

or taxis. Inform any persons that you have been in contact with since you 

started becoming symptomatic that you have tested positive; they should be made 

aware and take the appropriate steps as needed.


3.  You may alternate Tylenol and ibuprofen as needed for pain and fever 

management.


4. The [facility] department will be calling you and following up with you. 

The [place] COVID 19 Hotline phone number [phone], They are open Monday - 

Friday 7am - 7pm. Follow up with your primary care provider for re-evaluation 

and re-testing after quarantine and discuss when you should be seen. 


6. For more specific guidelines regarding isolation/quarantine please visit this

website. 

https://www.health.[place].gov/sites/www/files/documents/Files/KIARA/coronavirus/Factsh


eet_for_People_With_COVID-19.pdf





 











Sepsis Event Note (ED)





- Evaluation


Sepsis Screening Result: No Definite Risk





- Focused Exam


Vital Signs: 


                                   Vital Signs











  Temp Pulse Resp BP Pulse Ox


 


 12/05/21 11:03  98.1 F  117 H  30 H  118/70  94 L














- My Orders


Last 24 Hours: 


My Active Orders





12/05/21 11:00


Sodium Chloride 0.9% [Saline Flush]   10 ml FLUSH ASDIRECTED PRN 


Sodium Chloride 0.9% [Saline Flush]   2.5 ml FLUSH ASDIRECTED PRN 


Saline Lock Insert [OM.PC] Stat 














- Assessment/Plan


Last 24 Hours: 


My Active Orders





12/05/21 11:00


Sodium Chloride 0.9% [Saline Flush]   10 ml FLUSH ASDIRECTED PRN 


Sodium Chloride 0.9% [Saline Flush]   2.5 ml FLUSH ASDIRECTED PRN 


Saline Lock Insert [OM.PC] Stat

## 2021-12-05 NOTE — CR
Indication:



Chest pain shortness of breath.



Technique:



Chest 1 view.



Comparison:



11/30/2021.



Findings/Impression:



Cardiovascular and mediastinum: Heart size and vasculature are normal in 

caliber and appearance. 



Lungs and pleural space: Small subtle infiltrates are suspect in the 

periphery of both lungs. Acute pneumonitis such as from COVID-19 viral 

infection is possible. Remainder of the lungs and pleural spaces are clear.



Bones and soft tissues: No acute findings.



Dictated by Ron Moreno MD @ 12/5/2021 12:25:18 PM



(Electronically Signed)